# Patient Record
Sex: FEMALE | Race: WHITE
[De-identification: names, ages, dates, MRNs, and addresses within clinical notes are randomized per-mention and may not be internally consistent; named-entity substitution may affect disease eponyms.]

---

## 2019-09-24 ENCOUNTER — HOSPITAL ENCOUNTER (OUTPATIENT)
Dept: HOSPITAL 88 - US | Age: 84
End: 2019-09-24
Attending: UROLOGY
Payer: MEDICARE

## 2019-09-24 DIAGNOSIS — N28.1: Primary | ICD-10-CM

## 2019-09-24 PROCEDURE — 76770 US EXAM ABDO BACK WALL COMP: CPT

## 2019-09-24 NOTE — DIAGNOSTIC IMAGING REPORT
EXAM: Renal Ultrasound

INDICATION:        

^[id number]

^[id number]

^CYST OF KIDNEY  

COMPARISON: None 

TECHNIQUE: Transverse and longitudinal images of the kidneys and bladder were

obtained.  



FINDINGS:     



Right Kidney: 

Length: 11.4 cm

Appearance: Normal echogenicity.  

Collecting system: No hydronephrosis

Stones: None

Cyst/Mass: Upper pole 1.5 x 1.4 x 1.7 cm anechoic simple cyst. Lower pole

anechoic simple cysts measure 2.9 x 3.0 x 2.0 cm and 2.1 x 1.7 x 2.5 cm.



Left Kidney: 

Length: 11.0 cm

Appearance: Normal echogenicity.  

Collecting system: No hydronephrosis

Stones: None

Cyst/Mass: Upper pole anechoic simple cyst measuring 3.8 x 2.4 x 3.2 cm.



Bladder: 

No mass or calculi. Bilateral ureteral jets visualized.



IMPRESSION:

No renal calculi or hydronephrosis. Bilateral anechoic simple cysts as above.



Signed by: Leida Meredith MD on 9/24/2019 1:33 PM